# Patient Record
Sex: MALE | Race: WHITE
[De-identification: names, ages, dates, MRNs, and addresses within clinical notes are randomized per-mention and may not be internally consistent; named-entity substitution may affect disease eponyms.]

---

## 2017-07-06 ENCOUNTER — HOSPITAL ENCOUNTER (EMERGENCY)
Dept: HOSPITAL 47 - EC | Age: 32
Discharge: HOME | End: 2017-07-06
Payer: MEDICARE

## 2017-07-06 VITALS
SYSTOLIC BLOOD PRESSURE: 133 MMHG | DIASTOLIC BLOOD PRESSURE: 85 MMHG | TEMPERATURE: 98.9 F | HEART RATE: 98 BPM | RESPIRATION RATE: 18 BRPM

## 2017-07-06 DIAGNOSIS — J06.9: Primary | ICD-10-CM

## 2017-07-06 DIAGNOSIS — R68.84: ICD-10-CM

## 2017-07-06 DIAGNOSIS — F17.200: ICD-10-CM

## 2017-07-06 DIAGNOSIS — F90.1: ICD-10-CM

## 2017-07-06 DIAGNOSIS — Z79.899: ICD-10-CM

## 2017-07-06 DIAGNOSIS — H92.01: ICD-10-CM

## 2017-07-06 PROCEDURE — 99283 EMERGENCY DEPT VISIT LOW MDM: CPT

## 2017-07-06 NOTE — ED
ENT HPI





- General


Chief complaint: ENT


Stated complaint: Jaw Pain


Time Seen by Provider: 07/06/17 21:59


Source: patient


Mode of arrival: ambulatory


Limitations: no limitations





- History of Present Illness


Initial comments: 


Patient is a 32-year-old male presenting to the emergency department with chief 

complaint of right ear pain and right jaw pain.  Patient states approximately 4 

days ago he started having an upper respiratory infection with a runny nose, 

cough, and mild headache.  Patient states his right ear feels full and he has 

been trying to pop it by opening his jaw.  Treatment prior to arrival included 

Motrin and NyQuil.  Patient denies chills, fevers, nausea, vomiting, shortness 

of breath, chest pain, or abdominal pain.  Patient denies diarrhea or 

constipation. 








- Related Data


 Home Medications











 Medication  Instructions  Recorded  Confirmed


 


Dextroamphetamine/Amphetamine 40 mg PO DAILY 06/08/14 07/06/17





[Adderall]   








 Previous Rx's











 Medication  Instructions  Recorded


 


Pseudoephedrine HCl [Sudafed] 60 mg PO Q6HR #20 tab 07/06/17











 Allergies











Allergy/AdvReac Type Severity Reaction Status Date / Time


 


No Known Allergies Allergy   Verified 07/06/17 22:13














Review of Systems


ROS Statement: 


Those systems with pertinent positive or pertinent negative responses have been 

documented in the HPI.





ROS Other: All systems not noted in ROS Statement are negative.





Past Medical History


Past Medical History: No Reported History


Additional Past Medical History / Comment(s): ADHD


History of Any Multi-Drug Resistant Organisms: None Reported


Past Surgical History: No Surgical Hx Reported


Past Psychological History: ADD/ADHD


Smoking Status: Current every day smoker


Past Alcohol Use History: None Reported


Past Drug Use History: None Reported





General Exam





- General Exam Comments


Initial Comments: 


GENERAL: Pt awake and alert, well-appearing, well-nourished, and in no acute 

distress.


HEAD: Atraumatic, normocephalic.


EYES: Pupils equal, round, and reactive to light, extraocular movements intact, 

sclera anicteric, conjunctiva are normal.


ENT: Oropharynx with mild erythema without exudates.  Moist mucous membranes.  

Tympanic membranes without bulging or erythema.


NECK:Normal range of motion, supple without lymphadenopathy. 


LUNGS: Breath sounds clear to auscultation bilaterally.  No wheezes, rales, or 

rhonchi.


HEART: Heart S1, S2, no S3 or S4.  Regular rate and rhythm.  No murmurs, rubs 

or gallops.


ABDOMEN: Soft, nontender, nondistended, normoactive bowel sounds.  No guarding, 

no rebound.  No masses or organomegaly appreciated. 


MUSCULOSKELETAL:   Normal ROM, no tenderness.  Strength 5/5. 


EXTREMITIES: 2+ peripheral pulses. No edema. No calf tenderness.


NEUROLOGICAL: Pt oriented x 3.  No focal deficits noted. Strength and sensation 

grossly intact.


PSYCH: Normal mood, normal affect.


SKIN: Warm, dry, intact. Normal turgor. No rashes or lesions.





Limitations: no limitations





Course


 Vital Signs











  07/06/17





  21:22


 


Temperature 98.9 F


 


Pulse Rate 98


 


Respiratory 18





Rate 


 


Blood Pressure 133/85


 


O2 Sat by Pulse 96





Oximetry 














Medical Decision Making





- Medical Decision Making


Upper respiratory infection suspect viral.  Patient provided with prescription 

for Sudafed.  Patient instructed to return to the emergency department with any 

new or worsening symptoms.  Discharge instructions reviewed along with return 

parameters.








Disposition


Clinical Impression: 


 Upper respiratory infection, viral





Disposition: HOME SELF-CARE


Condition: Good


Additional Instructions: 


Continue Tylenol or Motrin for pain.  Continue Sudafed for congestion every 4-6 

hours as needed.  May use cold or warm compresses for relief.  Continue 

Claritin as already prescribed.  Follow-up with primary care physician.  

Patient returns emergency department with any new or worsening symptoms.


Prescriptions: 


Pseudoephedrine HCl [Sudafed] 60 mg PO Q6HR #20 tab


Referrals: 


Jonatan Eller DO [Primary Care Provider] - 1-2 days


Time of Disposition: 22:17

## 2019-04-11 NOTE — XR
EXAMINATION TYPE: XR chest 2V

 

DATE OF EXAM: 4/11/2019

 

COMPARISON: 6/8/2012

 

INDICATION: Cough and congestion

 

TECHNIQUE:  Frontal and lateral views of the chest are obtained.

 

FINDINGS:  

The heart size is normal.  

The pulmonary vasculature is normal.

The lungs are clear.

 

IMPRESSION:  

1. No acute pulmonary process.

## 2022-03-31 NOTE — CT
EXAMINATION TYPE: CT pelvis w con

 

DATE OF EXAM: 3/31/2022

 

INDICATION: Scrotal pain and swelling.

 

CT DLP: 1240 mGy.cm  Automated Exposure Control for Dose Reduction was Utilized.

 

TECHNIQUE AND CONTRAST: 

CT scan of the pelvis is performed with IV Contrast, patient injected with 100ml mL of Isovue 300.

 

COMPARISON: Ultrasound dated 3/20/2022

 

FINDINGS:

Duplex right kidney, slightly ptosed, without evidence of hydronephrosis. Unremarkable visualized por
tion of the left kidney. No hydroureter bilaterally. Grossly unremarkable urinary bladder. Unremarkab
le prostate and seminal vesicles. No gross scrotal abnormality.

 

No inguinal hernia. Small fat-containing umbilical hernia. Unremarkable visualized portion of the sma
ll bowel. Colonic diverticulosis without evidence of acute diverticulitis. Normal appendix. No suspic
ious pelvic lymphadenopathy or sizable pelvic fluid. Unremarkable visualized bones.

 

IMPRESSION:

No significant scrotal abnormality by this CT scan. No definite acute abnormality seen in the pelvis.
 Incidental findings as described above.

 

The previous scrotal ultrasound described signs of epididymoorchitis with could explain the patient's
 pain. Follow-up ultrasound can be considered if clinically required.

## 2022-12-08 NOTE — ED
Lower Extremity Injury HPI





- General


Source: patient


Mode of arrival: ambulatory


Limitations: no limitations





<Analilia Roger - Last Filed: 12/08/22 18:57>





- History of Present Illness


MD Complaint: leg injury


-: minutes(s)


Injury: Thigh: Right


Type of Injury: laceration


Place: work


Severity: moderate


Context: other


Treatments Prior to Arrival: bandage





<Nain Sanchez - Last Filed: 12/10/22 06:04>





- General


Chief Complaint: Extremity Injury, Lower


Stated Complaint: [facility] - rt leg laceration


Time Seen by Provider: 12/08/22 17:11





- History of Present Illness


Initial Comments: 





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  The patient states she 

was working this afternoon in the kitchen, he backed up and backed into a knife 

point.  The patient states he states there was a large amount of blood.  He 

denies weakness or numbness distal to the injury.  No other trauma.  When 

questioned, patient not entirely sure when his last tetanus shot had been given.

(Nani Sanchez)





- Related Data


                                Home Medications











 Medication  Instructions  Recorded  Confirmed


 


lisinopriL [Zestril] 20 mg PO DAILY 03/20/22 12/08/22


 


Dextroamphetamine/Amphetamine 30 mg PO DAILY 12/08/22 12/08/22





[Dextroamphetamine/Amphetamine ER   





30 mg Cap]   











                                    Allergies











Allergy/AdvReac Type Severity Reaction Status Date / Time


 


hydrocodone AdvReac  SEE COMMENT Verified 12/08/22 18:13














Review of Systems


ROS Other: All systems not noted in ROS Statement are negative.





<Analilia Roger - Last Filed: 12/08/22 18:57>


ROS Other: All systems not noted in ROS Statement are negative.


Constitutional: Denies: fever, weakness


Respiratory: Denies: cough, dyspnea


Cardiovascular: Denies: chest pain, palpitations, edema


Skin: Reports: other (Leg laceration)


Neurological: Denies: weakness, numbness, paresthesias


Hematological/Lymphatic: Denies: easy bleeding





<Nain Sanchez - Last Filed: 12/10/22 06:04>


ROS Statement: 


Those systems with pertinent positive or pertinent negative responses have been 

documented in the HPI.








Past Medical History


Past Medical History: No Reported History


Additional Past Medical History / Comment(s): ADHD


History of Any Multi-Drug Resistant Organisms: None Reported


Past Surgical History: No Surgical Hx Reported


Past Psychological History: ADD/ADHD


Smoking Status: Current every day smoker


Past Alcohol Use History: None Reported


Past Drug Use History: None Reported





<Analilia Roger - Last Filed: 12/08/22 18:57>





General Exam


Limitations: no limitations





<Analilia Roger - Last Filed: 12/08/22 18:57>


General appearance: alert, in no apparent distress


Head exam: Present: atraumatic, normocephalic


Respiratory exam: Present: normal lung sounds bilaterally.  Absent: respiratory 

distress, wheezes, rales, rhonchi, stridor


Cardiovascular Exam: Present: regular rate, normal rhythm, normal heart sounds. 

Absent: systolic murmur, diastolic murmur, rubs, gallop


GI/Abdominal exam: Present: soft.  Absent: distended, tenderness, guarding


Back exam: Present: normal inspection


Neurological exam: Present: alert.  Absent: motor sensory deficit


Skin exam: Present: warm, dry, normal color, other (Patient has approximately 5 

cm laceration to the posterior aspect of the right leg.  There is currently no 

arterial bleeding.  There is no sensorimotor deficit distal to the injury.  

There are good distal pulses.  Normal capillary refill.)





<Nain Sanchez - Last Filed: 12/10/22 06:04>





Course


                                   Vital Signs











  12/08/22 12/08/22





  16:59 19:43


 


Temperature 97 F L 


 


Pulse Rate 99 87


 


Respiratory 20 15





Rate  


 


Blood Pressure 142/86 139/74


 


O2 Sat by Pulse 95 100





Oximetry  














Procedures





- Laceration


  ** Laceration #1


Consent Obtained: verbal consent


Indication: laceration


Site: lower extremity (right thigh)


Size (cm): 5


Description: linear


Depth: involves muscle layer


Anesthetic Used: lidocaine 1%


Anesthesia Technique: local infiltration


Pre-repair: wound explored, irrigated extensively, deep structures intact


Type of Sutures: nylon


Size of Sutures: 4-0


Number of Sutures: 7


Technique: simple, interrupted


Patient Tolerated Procedure: well, no complications





<Analilia Roger - Last Filed: 12/08/22 18:57>





Medical Decision Making





- Lab Data


Result diagrams: 


                                 12/08/22 17:52





                                 12/08/22 17:52





<Analilia Roger - Last Filed: 12/08/22 18:57>





- Lab Data


Result diagrams: 


                                 12/08/22 17:52





                                 12/08/22 17:52





<Nain Sanchez - Last Filed: 12/10/22 06:04>





- Medical Decision Making





I have reviewed all documentation, results, and performed the MDM in its 

entirety, which constitutes more than 50% of the visit (Nain Sanchez)





- Lab Data


                                   Lab Results











  12/08/22 12/08/22 Range/Units





  17:52 17:52 


 


WBC  9.6   (3.8-10.6)  k/uL


 


RBC  5.17   (4.30-5.90)  m/uL


 


Hgb  16.4   (13.0-17.5)  gm/dL


 


Hct  44.0   (39.0-53.0)  %


 


MCV  85.0   (80.0-100.0)  fL


 


MCH  31.7   (25.0-35.0)  pg


 


MCHC  37.3 H   (31.0-37.0)  g/dL


 


RDW  12.2   (11.5-15.5)  %


 


Plt Count  407   (150-450)  k/uL


 


MPV  7.1   


 


Neutrophils %  53   %


 


Lymphocytes %  35   %


 


Monocytes %  5   %


 


Eosinophils %  3   %


 


Basophils %  1   %


 


Neutrophils #  5.1   (1.3-7.7)  k/uL


 


Lymphocytes #  3.4   (1.0-4.8)  k/uL


 


Monocytes #  0.5   (0-1.0)  k/uL


 


Eosinophils #  0.3   (0-0.7)  k/uL


 


Basophils #  0.1   (0-0.2)  k/uL


 


Sodium   140  (137-145)  mmol/L


 


Potassium   4.2  (3.5-5.1)  mmol/L


 


Chloride   106  ()  mmol/L


 


Carbon Dioxide   23  (22-30)  mmol/L


 


Anion Gap   11  mmol/L


 


BUN   13  (9-20)  mg/dL


 


Creatinine   0.78  (0.66-1.25)  mg/dL


 


Est GFR (CKD-EPI)AfAm   >90  (>60 ml/min/1.73 sqM)  


 


Est GFR (CKD-EPI)NonAf   >90  (>60 ml/min/1.73 sqM)  


 


Glucose   137 H  (74-99)  mg/dL


 


Calcium   9.0  (8.4-10.2)  mg/dL














Disposition





<Analilia Roger - Last Filed: 12/08/22 18:57>


Is patient prescribed a controlled substance at d/c from ED?: No





<Nain Sanchez - Last Filed: 12/10/22 06:04>


Clinical Impression: 


 Leg laceration





Disposition: HOME SELF-CARE


Condition: Good


Instructions (If sedation given, give patient instructions):  Laceration (DC)


Referrals: 


Jonatan Eller DO [Primary Care Provider] - 1-2 days

## 2022-12-08 NOTE — XR
PROCEDURE: XR femur RT - 4V

DATE AND TIME: 12/8/2022 5:40 PM

 

CLINICAL INDICATION: PHH; Possible FB

 

TECHNIQUE: Department protocol

 

COMPARISON: None

 

FINDINGS: Imaging obtained from the right hip to the right knee with orthogonal views.

 

THIGH SOFT TISSUES: Multifocal low attenuation noted, suspicious for multiple subcentimeter bubbles o
f gas. No radiopaque foreign bodies.

 

RIGHT FEMUR: Bones and joints are unremarkable.

 

 

IMPRESSION:

Soft tissue emphysema.